# Patient Record
Sex: FEMALE | Race: WHITE
[De-identification: names, ages, dates, MRNs, and addresses within clinical notes are randomized per-mention and may not be internally consistent; named-entity substitution may affect disease eponyms.]

---

## 2019-11-08 ENCOUNTER — HOSPITAL ENCOUNTER (OUTPATIENT)
Dept: HOSPITAL 74 - JASU-SURG | Age: 34
Discharge: HOME | End: 2019-11-08
Attending: PHYSICAL MEDICINE & REHABILITATION
Payer: COMMERCIAL

## 2019-11-08 VITALS — TEMPERATURE: 97.6 F | SYSTOLIC BLOOD PRESSURE: 98 MMHG | HEART RATE: 62 BPM | DIASTOLIC BLOOD PRESSURE: 57 MMHG

## 2019-11-08 VITALS — BODY MASS INDEX: 31.1 KG/M2

## 2019-11-08 DIAGNOSIS — M54.5: ICD-10-CM

## 2019-11-08 DIAGNOSIS — M46.97: Primary | ICD-10-CM

## 2019-11-08 PROCEDURE — BR16YZZ FLUOROSCOPY OF LUMBAR FACET JOINT(S) USING OTHER CONTRAST: ICD-10-PCS | Performed by: PHYSICAL MEDICINE & REHABILITATION

## 2019-11-08 PROCEDURE — 3E0T33Z INTRODUCTION OF ANTI-INFLAMMATORY INTO PERIPHERAL NERVES AND PLEXI, PERCUTANEOUS APPROACH: ICD-10-PCS | Performed by: PHYSICAL MEDICINE & REHABILITATION

## 2019-11-08 PROCEDURE — 3E0T3BZ INTRODUCTION OF ANESTHETIC AGENT INTO PERIPHERAL NERVES AND PLEXI, PERCUTANEOUS APPROACH: ICD-10-PCS | Performed by: PHYSICAL MEDICINE & REHABILITATION

## 2019-11-14 NOTE — PROC
Procedure Note


Procedure: 





Date of service: 11/8/2019





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

Left


Postoperative Diagnosis: Same


Procedure Performed: Lumbar Facet Blocks Diagnostic ) on Right / Left L3-4/L4-5

/ L5-S1 with dye under Fluoroscopy


Anesthesia: Local / MAC


Anesthesiologist: 





Procedure: I discussed with the patient in detail about the risks, benefits and 

alternatives to treatment not only limited to infection, headache, numbness, 

weakness and injury to nerves, spinal cord, blood vessels and muscles. The 

patient understood, agreed and signed the written consent. The patient was 

placed in the prone position with the head, abdomen and legs supported with the 

pillows.  The patients lower back was prepped and draped in a sterile fashion. 

Under C-arm and Scottie dog view eye was identified the L2-3, L3-4 and L4-5 

levels on  Right  side.  At the level of L3-4 (L3), 2 ml of 2% Lidocaine was 

infiltrated into the skin and subcutaneous tissue. A 3.5 inch #23 guage spinal 

needle was used to approach the eye of the Scottie dog in the oblique view 

until the tip of the needle contacted the bone with the use of intermittent 

fluoroscopy. Needle placement was confirmed both in the AP and oblique view.  

Aspiration was done which was negative for blood. 0.20 ml of dye omnipaque was 

injected to see the spread of the dye.  A solution of 0.5 ml of preservative 

free 0.5% Marcaine was injected at this level.  While the needle was withdrawn 

1 ml of 2% Lidocaine was infiltrated. Similar procedure was repeated at left/ 

Right L4-5 (L4) and L5-S1 (L5)   and Left L3-4level. The patient tolerated the 

procedure well.  Bleeding was checked.  There were no immediate complications.  

The patient was observed and asked about pain level.  The patient mentioned 

that there was improvement was more than 50%.  The patient was told to apply 

ice at the injection sites.  If there is any problem, call my office or report 

to the ER . Patient was discharged as per [facility] criteria.





Braxton Jauregui M.D.

## 2020-01-03 ENCOUNTER — HOSPITAL ENCOUNTER (OUTPATIENT)
Dept: HOSPITAL 74 - JASU-SURG | Age: 35
Discharge: HOME | End: 2020-01-03
Attending: PHYSICAL MEDICINE & REHABILITATION
Payer: COMMERCIAL

## 2020-01-03 VITALS — DIASTOLIC BLOOD PRESSURE: 67 MMHG | SYSTOLIC BLOOD PRESSURE: 103 MMHG | HEART RATE: 64 BPM

## 2020-01-03 VITALS — BODY MASS INDEX: 31.4 KG/M2

## 2020-01-03 VITALS — TEMPERATURE: 97.6 F

## 2020-01-03 DIAGNOSIS — M54.5: ICD-10-CM

## 2020-01-03 DIAGNOSIS — M46.86: Primary | ICD-10-CM

## 2020-01-03 PROCEDURE — BR16YZZ FLUOROSCOPY OF LUMBAR FACET JOINT(S) USING OTHER CONTRAST: ICD-10-PCS | Performed by: PHYSICAL MEDICINE & REHABILITATION

## 2020-01-03 PROCEDURE — 3E0T3BZ INTRODUCTION OF ANESTHETIC AGENT INTO PERIPHERAL NERVES AND PLEXI, PERCUTANEOUS APPROACH: ICD-10-PCS | Performed by: PHYSICAL MEDICINE & REHABILITATION

## 2020-01-03 PROCEDURE — 3E0T33Z INTRODUCTION OF ANTI-INFLAMMATORY INTO PERIPHERAL NERVES AND PLEXI, PERCUTANEOUS APPROACH: ICD-10-PCS | Performed by: PHYSICAL MEDICINE & REHABILITATION
